# Patient Record
Sex: FEMALE | Race: WHITE
[De-identification: names, ages, dates, MRNs, and addresses within clinical notes are randomized per-mention and may not be internally consistent; named-entity substitution may affect disease eponyms.]

---

## 2018-06-22 ENCOUNTER — HOSPITAL ENCOUNTER (OUTPATIENT)
Dept: HOSPITAL 17 - HSDC | Age: 43
Setting detail: OBSERVATION
LOS: 1 days | Discharge: HOME | End: 2018-06-23
Attending: OBSTETRICS & GYNECOLOGY | Admitting: OBSTETRICS & GYNECOLOGY
Payer: COMMERCIAL

## 2018-06-22 VITALS
TEMPERATURE: 97.7 F | SYSTOLIC BLOOD PRESSURE: 96 MMHG | DIASTOLIC BLOOD PRESSURE: 56 MMHG | RESPIRATION RATE: 12 BRPM | HEART RATE: 91 BPM

## 2018-06-22 VITALS
SYSTOLIC BLOOD PRESSURE: 108 MMHG | OXYGEN SATURATION: 97 % | HEART RATE: 97 BPM | RESPIRATION RATE: 18 BRPM | TEMPERATURE: 98.5 F | DIASTOLIC BLOOD PRESSURE: 57 MMHG

## 2018-06-22 VITALS
OXYGEN SATURATION: 97 % | SYSTOLIC BLOOD PRESSURE: 102 MMHG | TEMPERATURE: 98.3 F | DIASTOLIC BLOOD PRESSURE: 53 MMHG | HEART RATE: 106 BPM | RESPIRATION RATE: 18 BRPM

## 2018-06-22 VITALS — WEIGHT: 139.33 LBS | HEIGHT: 64 IN | BODY MASS INDEX: 23.79 KG/M2

## 2018-06-22 DIAGNOSIS — D25.1: Primary | ICD-10-CM

## 2018-06-22 DIAGNOSIS — N80.0: ICD-10-CM

## 2018-06-22 DIAGNOSIS — N94.6: ICD-10-CM

## 2018-06-22 DIAGNOSIS — D25.2: ICD-10-CM

## 2018-06-22 DIAGNOSIS — R10.2: ICD-10-CM

## 2018-06-22 DIAGNOSIS — Z01.810: ICD-10-CM

## 2018-06-22 PROCEDURE — 85025 COMPLETE CBC W/AUTO DIFF WBC: CPT

## 2018-06-22 PROCEDURE — 00840 ANES IPER PX LOWER ABD NOS: CPT

## 2018-06-22 PROCEDURE — 86900 BLOOD TYPING SEROLOGIC ABO: CPT

## 2018-06-22 PROCEDURE — 86901 BLOOD TYPING SEROLOGIC RH(D): CPT

## 2018-06-22 PROCEDURE — 96375 TX/PRO/DX INJ NEW DRUG ADDON: CPT

## 2018-06-22 PROCEDURE — 96374 THER/PROPH/DIAG INJ IV PUSH: CPT

## 2018-06-22 PROCEDURE — G0378 HOSPITAL OBSERVATION PER HR: HCPCS

## 2018-06-22 PROCEDURE — 96376 TX/PRO/DX INJ SAME DRUG ADON: CPT

## 2018-06-22 PROCEDURE — 58542 LSH W/T/O UT 250 G OR LESS: CPT

## 2018-06-22 PROCEDURE — C1765 ADHESION BARRIER: HCPCS

## 2018-06-22 PROCEDURE — 58662 LAPAROSCOPY EXCISE LESIONS: CPT

## 2018-06-22 PROCEDURE — 80048 BASIC METABOLIC PNL TOTAL CA: CPT

## 2018-06-22 PROCEDURE — 93005 ELECTROCARDIOGRAM TRACING: CPT

## 2018-06-22 PROCEDURE — 94150 VITAL CAPACITY TEST: CPT

## 2018-06-22 PROCEDURE — 88307 TISSUE EXAM BY PATHOLOGIST: CPT

## 2018-06-22 PROCEDURE — 86850 RBC ANTIBODY SCREEN: CPT

## 2018-06-22 RX ADMIN — ACETAMINOPHEN SCH MLS/HR: 10 INJECTION, SOLUTION INTRAVENOUS at 17:59

## 2018-06-22 RX ADMIN — HYDROMORPHONE HYDROCHLORIDE PRN MG: 2 INJECTION INTRAMUSCULAR; INTRAVENOUS; SUBCUTANEOUS at 14:27

## 2018-06-22 RX ADMIN — Medication PRN ML: at 22:43

## 2018-06-22 RX ADMIN — ACETAMINOPHEN SCH MLS/HR: 10 INJECTION, SOLUTION INTRAVENOUS at 22:42

## 2018-06-22 RX ADMIN — HYDROMORPHONE HYDROCHLORIDE PRN MG: 2 INJECTION INTRAMUSCULAR; INTRAVENOUS; SUBCUTANEOUS at 22:43

## 2018-06-22 RX ADMIN — HYDROMORPHONE HYDROCHLORIDE PRN MG: 2 INJECTION INTRAMUSCULAR; INTRAVENOUS; SUBCUTANEOUS at 18:53

## 2018-06-22 RX ADMIN — OXYTOCIN SCH MLS/HR: 10 INJECTION, SOLUTION INTRAMUSCULAR; INTRAVENOUS at 13:17

## 2018-06-22 RX ADMIN — DOCUSATE SODIUM SCH MG: 100 CAPSULE, LIQUID FILLED ORAL at 13:17

## 2018-06-22 RX ADMIN — OXYTOCIN SCH MLS/HR: 10 INJECTION, SOLUTION INTRAMUSCULAR; INTRAVENOUS at 22:42

## 2018-06-22 RX ADMIN — DOCUSATE SODIUM SCH MG: 100 CAPSULE, LIQUID FILLED ORAL at 22:41

## 2018-06-22 NOTE — PD.OP
__________________________________________________





Operative Report


Date of Surgery:  Jun 22, 2018


Preoperative Diagnosis:  


(1) Intramural leiomyoma of uterus


(2) Subserosal leiomyoma of uterus


(3) Primary dysmenorrhea


(4) Pelvic and perineal pain


Postoperative Diagnosis:  


(1) Intramural leiomyoma of uterus


(2) Subserosal leiomyoma of uterus


(3) Primary dysmenorrhea


(4) Pelvic and perineal pain


(5) Endometriosis determined by laparoscopy


Procedure:


1. laparoscopic supracervical hysterectomy


2. bilateral salpingectomy


3. fulguration of endometriosis


Anesthesia:


ANTWON


Surgeon:


Kyra Dasilva


Assistant(s):


OR staff


Operation and Findings:


IVF: 1300 ml LR + IV antibiotics + Methylene blue


EBL: 100 ml


UO: 100 ml


Findings: 


1, normal tubes and ovaries


2. multiple fibroids


3. endometriosis


Specimens: tubes and uterus


Complications: none


Condition: stable


Disposition: PACU








Descriptions of the procedure: I discussed the risks, benefits and alternatives 

of the procedure with the patient. Informed consent was obtained after 

questions were answered. She was then taken to the operating room with her IV 

running. She was placed in the supine position and was given general anesthesia 

without difficulties or complications. Iv antibiotics were given. She was then 

placed in the dorsal lithotomy position and was prepped and draped in the usual 

sterile fashion.





Attention was first turned to the patient's genital area. A bivalved speculum 

was introduced inside the patient's vagina. The anterior aspect of the cervix 

was grasped with a single tooth tenaculum for manipulation. The cervix was 

carefully dilated and electrocauterized with the Bovie. A uterine manipulator 

was carefully introduced inside her uterus. The rest of the instruments were 

removed from the patient's vagina. A sterile blue towel was used to cover the 

perineum.





The surgeon changed gloves and attention was then turned to the patient's 

abdomen. A vertical umbilical incision was made with the scalpel. A 5 mm trocar 

was introduced inside the patient's abdomen under direct visualization. A pneumo

-peritoneum was created with CO2 gas. Two 5 mm trocars and one 10 mm trocar 

were introduced inside the patient's abdomen under direct visualization in the 

lower right, left and mid abdomen. A survey of the patient's abdomen revealed 

normal anatomy. A survey of the patient's pelvis revealed the findings noted 

above. The fallopian tubes were carefully grasped, electrocauterized and 

transected with the Harmonic scalpel. The round ligaments and the utero-ovarian 

ligaments were carefully and serially grasped, electrocauterized and cut with 

the Harmonic scalpel. Excellent hemostasis was noted. Methylene blue dye was 

given to patient.





The tissues along the uterus on both sides were serially grasped, 

electrocauterized and transected with the Harmonic scalpel. The ureters were 

noted to be away from the surgical site. The uterine vessels were skeletonized, 

electrocauterized and transected with the Harmonic scalpel. Good hemostasis was 

noted. Next, the bladder flap was created and the bladder was dissected off the 

lower uterine segment. Excellent hemostasis was noted. 





The uterine manipulator was removed. Lashonda loop was used to cut and 

electrocauterize the cervico-uterine junction. Good hemostasis was noted. The 

Kleppinger was used to electrocauterize the endocervix. The morcellator was 

introduced inside the abdomen under direct visualization and was used to cut 

the uterus. The tissues were carefully removed and sent to Pathology. 





The surgical sites were noted to be hemostatic. Copious irrigation was done. 

Care was taken to ensure the removal of all small pieces of tissue which were 

left in the abdomen and pelvis after morcellation. Methylene blue dye was given 

at the beginning of the surgical procedure. The ureters were identified again 

and were found to be away from the surgical sites. There was no evidence of 

injury or blockage of the ureters or bladder. Intercede was placed over the 

cervix. All of the instruments were removed from the patient's abdomen. The 

ports were also removed under direct visualization. Excellent hemostasis was 

noted. The CO2 gas was carefully expressed out of the patient's abdomen. The 10 

mm fascial incision was reapproximated with a figure eight stitch of 0-Vicryl. 

The skin incisions were injected with 0.5 % Marcaine and were reapproximated 

with subcutaneous stitches of 4-0 Vicryl. Mastisol and steri strips were placed 

over the incisions.





The patient tolerated the procedure well. She was successfully extubated and 

transferred to PACU in stable condition. 





Note: I WAS NOT able to discuss surgical procedures and surgical findings with 

patient's ; he was not in the waiting and didn't answer the cell phone. 

I left a voicemail.











Kyra Dasilva MD Jun 22, 2018 07:50

## 2018-06-22 NOTE — EKG
Date Performed: 06/22/2018       Time Performed: 10:24:45

 

PTAGE:      42 years

 

EKG:      Sinus rhythm 

 

 NORMAL ECG 

 

NO PREVIOUS TRACING            

 

DOCTOR:   Lev Vallejo  Interpretating Date/Time  06/22/2018 12:24:39

## 2018-06-23 VITALS
SYSTOLIC BLOOD PRESSURE: 106 MMHG | TEMPERATURE: 97.4 F | DIASTOLIC BLOOD PRESSURE: 60 MMHG | HEART RATE: 90 BPM | RESPIRATION RATE: 18 BRPM | OXYGEN SATURATION: 100 %

## 2018-06-23 VITALS
HEART RATE: 98 BPM | TEMPERATURE: 98.7 F | OXYGEN SATURATION: 97 % | DIASTOLIC BLOOD PRESSURE: 55 MMHG | SYSTOLIC BLOOD PRESSURE: 97 MMHG | RESPIRATION RATE: 18 BRPM

## 2018-06-23 VITALS
DIASTOLIC BLOOD PRESSURE: 58 MMHG | RESPIRATION RATE: 18 BRPM | SYSTOLIC BLOOD PRESSURE: 100 MMHG | TEMPERATURE: 98.9 F | OXYGEN SATURATION: 97 % | HEART RATE: 87 BPM

## 2018-06-23 VITALS
OXYGEN SATURATION: 97 % | SYSTOLIC BLOOD PRESSURE: 97 MMHG | HEART RATE: 98 BPM | RESPIRATION RATE: 18 BRPM | TEMPERATURE: 98.7 F | DIASTOLIC BLOOD PRESSURE: 55 MMHG

## 2018-06-23 LAB
BASOPHILS # BLD AUTO: 0 TH/MM3 (ref 0–0.2)
BASOPHILS NFR BLD: 0.1 % (ref 0–2)
BUN SERPL-MCNC: 9 MG/DL (ref 7–18)
CALCIUM SERPL-MCNC: 7.6 MG/DL (ref 8.5–10.1)
CHLORIDE SERPL-SCNC: 105 MEQ/L (ref 98–107)
CREAT SERPL-MCNC: 0.55 MG/DL (ref 0.5–1)
EOSINOPHIL # BLD: 0 TH/MM3 (ref 0–0.4)
EOSINOPHIL NFR BLD: 0 % (ref 0–4)
ERYTHROCYTE [DISTWIDTH] IN BLOOD BY AUTOMATED COUNT: 11.6 % (ref 11.6–17.2)
GFR SERPLBLD BASED ON 1.73 SQ M-ARVRAT: 121 ML/MIN (ref 89–?)
GLUCOSE SERPL-MCNC: 84 MG/DL (ref 74–106)
HCO3 BLD-SCNC: 24.9 MEQ/L (ref 21–32)
HCT VFR BLD CALC: 30.6 % (ref 35–46)
HGB BLD-MCNC: 10.5 GM/DL (ref 11.6–15.3)
LYMPHOCYTES # BLD AUTO: 1.3 TH/MM3 (ref 1–4.8)
LYMPHOCYTES NFR BLD AUTO: 12.5 % (ref 9–44)
MCH RBC QN AUTO: 32.8 PG (ref 27–34)
MCHC RBC AUTO-ENTMCNC: 34.2 % (ref 32–36)
MCV RBC AUTO: 95.7 FL (ref 80–100)
MONOCYTE #: 0.9 TH/MM3 (ref 0–0.9)
MONOCYTES NFR BLD: 8.6 % (ref 0–8)
NEUTROPHILS # BLD AUTO: 7.9 TH/MM3 (ref 1.8–7.7)
NEUTROPHILS NFR BLD AUTO: 78.8 % (ref 16–70)
PLATELET # BLD: 171 TH/MM3 (ref 150–450)
PMV BLD AUTO: 9.1 FL (ref 7–11)
RBC # BLD AUTO: 3.19 MIL/MM3 (ref 4–5.3)
SODIUM SERPL-SCNC: 139 MEQ/L (ref 136–145)
WBC # BLD AUTO: 10 TH/MM3 (ref 4–11)

## 2018-06-23 RX ADMIN — OXYCODONE HYDROCHLORIDE AND ACETAMINOPHEN PRN TAB: 5; 325 TABLET ORAL at 06:17

## 2018-06-23 RX ADMIN — OXYCODONE HYDROCHLORIDE AND ACETAMINOPHEN PRN TAB: 5; 325 TABLET ORAL at 10:32

## 2018-06-23 RX ADMIN — OXYTOCIN SCH MLS/HR: 10 INJECTION, SOLUTION INTRAMUSCULAR; INTRAVENOUS at 06:00

## 2018-06-23 RX ADMIN — DOCUSATE SODIUM SCH MG: 100 CAPSULE, LIQUID FILLED ORAL at 08:42

## 2018-06-23 RX ADMIN — HYDROMORPHONE HYDROCHLORIDE PRN MG: 2 INJECTION INTRAMUSCULAR; INTRAVENOUS; SUBCUTANEOUS at 02:47

## 2018-06-23 RX ADMIN — Medication PRN ML: at 02:47

## 2018-06-23 NOTE — HHI.DCPOC
Discharge Care Plan


Diagnosis:  


(1) Intramural leiomyoma of uterus


(2) Subserosal leiomyoma of uterus


(3) Primary dysmenorrhea


(4) Pelvic and perineal pain


(5) Endometriosis determined by laparoscopy








Your Health Problems Are: Pelvic pain








Report Symptoms to Your Doctor


-Temperature above 100.5 degrees


-Redness, of incision or excessive or foul smelling drainage


-Unusual pain or calf pain


-Increased vaginal bleeding


-Painful or difficulty urinating


-Feelings of extreme sadness or anxiety after 2 weeks


Goals to Promote Your Health


* To prevent worsening of your condition and complications


* To maintain your health at the optimal level


Directions to Meet Your Goals


*** Take your medications as prescribed


*** Follow your dietary instruction


*** Follow activity as directed


*** Ensure plenty of rest for recovery


*** Drink fluids for hydration








*** Keep your appointments as scheduled


*** Take your immunizations and boosters as scheduled


*** If your symptoms worsen call your PCP, if no PCP go to Urgent Care Center 

or Emergency Room***


*** Smoking is Dangerous to Your Health. Avoid second hand smoke***


***Call the 24-hour crisis hotline for domestic abuse at 1-964.376.4034***











Kyra Dasilva MD Jun 23, 2018 08:30

## 2018-06-23 NOTE — HHI.PR
Subjective


Remarks


Doing well, pain is well controlled, eating well.





Objective


Vital Signs





Vital Signs








  Date Time  Temp Pulse Resp B/P (MAP) Pulse Ox O2 Delivery O2 Flow Rate FiO2


 


6/23/18 08:23 98.9 87 18 100/58 (72) 97   


 


6/23/18 04:30 98.7 98 18 97/55 (69) 97   


 


6/22/18 23:50 98.3 106 18 102/53 (69) 97   


 


6/22/18 20:30 98.5 97 18 108/57 (74) 97   


 


6/22/18 14:00 97.7 91 12 96/56 (69)    


 


6/22/18 13:45  94 16 98/57 (71) 100 Room Air  


 


6/22/18 13:30  76 16 95/53 (67) 100 Room Air  


 


6/22/18 13:15  75 16 91/54 (66) 100 Room Air  


 


6/22/18 13:00  74 16 87/56 (66) 100 Nasal Cannula 2 


 


6/22/18 12:56 97.4 71 16 90/54 (66) 100 Nasal Cannula 2 


 


6/22/18 09:55 98.9 88 16 109/71 (84) 100   














I/O      


 


 6/22/18 6/22/18 6/22/18 6/23/18 6/23/18 6/23/18





 07:00 15:00 23:00 07:00 15:00 23:00


 


Intake Total  1300 ml    


 


Output Total  200 ml 650 ml 550 ml  


 


Balance  1100 ml -650 ml -550 ml  


 


      


 


Intake Other  1300 ml    


 


Output Urine Total  100 ml 650 ml 550 ml  


 


Estimated Blood Loss  100 ml    








Result Diagram:  


6/23/18 0451                                                                   

             6/23/18 0451





Objective Remarks


Chest is clear, regular rate and rhythm.


Abdomen is soft and non-distended.


Incision is clean and dry.


Ext no CCE.





A/P


Assessment and Plan


Post Op Day 1


Doing well


Home today and return to office in two weeks.











Kyra Dasilva MD Jun 23, 2018 08:26